# Patient Record
Sex: MALE | Race: WHITE
[De-identification: names, ages, dates, MRNs, and addresses within clinical notes are randomized per-mention and may not be internally consistent; named-entity substitution may affect disease eponyms.]

---

## 2018-05-01 ENCOUNTER — HOSPITAL ENCOUNTER (EMERGENCY)
Dept: HOSPITAL 57 - BURERS | Age: 78
Discharge: HOME | End: 2018-05-01
Payer: MEDICARE

## 2018-05-01 DIAGNOSIS — E03.9: ICD-10-CM

## 2018-05-01 DIAGNOSIS — Z79.84: ICD-10-CM
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
## 2018-05-01 DIAGNOSIS — Z87.891: ICD-10-CM

## 2018-05-01 DIAGNOSIS — Z79.899: ICD-10-CM

## 2018-05-01 DIAGNOSIS — S01.01XA: Primary | ICD-10-CM

## 2018-05-01 DIAGNOSIS — E11.9: ICD-10-CM
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
## 2018-05-01 DIAGNOSIS — F32.9: ICD-10-CM

## 2018-05-01 DIAGNOSIS — S01.81XA: ICD-10-CM

## 2018-05-01 LAB
ALBUMIN SERPL BCG-MCNC: 3.7 G/DL (ref 3.4–4.8)
ALP SERPL-CCNC: 94 U/L (ref 40–150)
ALT SERPL W P-5'-P-CCNC: 23 U/L (ref 8–55)
ANION GAP SERPL CALC-SCNC: 16 MMOL/L (ref 10–20)
AST SERPL-CCNC: 36 U/L (ref 5–34)
BASOPHILS # BLD AUTO: 0 THOU/UL (ref 0–0.2)
BASOPHILS NFR BLD AUTO: 1.2 % (ref 0–1)
BILIRUB SERPL-MCNC: 0.5 MG/DL (ref 0.2–1.2)
BUN SERPL-MCNC: 13 MG/DL (ref 8.4–25.7)
CALCIUM SERPL-MCNC: 9.3 MG/DL (ref 7.8–10.44)
CHLORIDE SERPL-SCNC: 106 MMOL/L (ref 98–107)
CK MB SERPL-MCNC: 6.9 NG/ML (ref 0–6.6)
CO2 SERPL-SCNC: 23 MMOL/L (ref 23–31)
CREAT CL PREDICTED SERPL C-G-VRATE: 0 ML/MIN (ref 70–130)
EOSINOPHIL # BLD AUTO: 0.1 THOU/UL (ref 0–0.7)
EOSINOPHIL NFR BLD AUTO: 1.9 % (ref 0–10)
GLOBULIN SER CALC-MCNC: 3 G/DL (ref 2.4–3.5)
GLUCOSE SERPL-MCNC: 262 MG/DL (ref 83–110)
HGB BLD-MCNC: 12.1 G/DL (ref 14–18)
LYMPHOCYTES # BLD AUTO: 0.9 THOU/UL (ref 1.2–3.4)
LYMPHOCYTES NFR BLD AUTO: 24.9 % (ref 21–51)
MANUAL DIFF??: NO
MCH RBC QN AUTO: 30.8 PG (ref 27–31)
MCV RBC AUTO: 91.2 FL (ref 80–94)
MDIFF COMPLETE?: YES
MONOCYTES # BLD AUTO: 0.2 THOU/UL (ref 0.11–0.59)
MONOCYTES NFR BLD AUTO: 5.6 % (ref 0–10)
NEUTROPHILS # BLD AUTO: 2.5 THOU/UL (ref 1.4–6.5)
NEUTROPHILS NFR BLD AUTO: 66.4 % (ref 42–75)
PLATELET # BLD AUTO: 78 THOU/UL (ref 130–400)
POTASSIUM SERPL-SCNC: 2.7 MMOL/L (ref 3.5–5.1)
RBC # BLD AUTO: 3.94 MILL/UL (ref 4.7–6.1)
SODIUM SERPL-SCNC: 142 MMOL/L (ref 136–145)
TROPONIN I SERPL DL<=0.01 NG/ML-MCNC: 0.02 NG/ML (ref ?–0.03)
WBC # BLD AUTO: 3.7 THOU/UL (ref 4.8–10.8)

## 2018-05-01 PROCEDURE — 74177 CT ABD & PELVIS W/CONTRAST: CPT

## 2018-05-01 PROCEDURE — 83605 ASSAY OF LACTIC ACID: CPT

## 2018-05-01 PROCEDURE — 82553 CREATINE MB FRACTION: CPT

## 2018-05-01 PROCEDURE — 12013 RPR F/E/E/N/L/M 2.6-5.0 CM: CPT

## 2018-05-01 PROCEDURE — 71045 X-RAY EXAM CHEST 1 VIEW: CPT

## 2018-05-01 PROCEDURE — 72125 CT NECK SPINE W/O DYE: CPT

## 2018-05-01 PROCEDURE — 80053 COMPREHEN METABOLIC PANEL: CPT

## 2018-05-01 PROCEDURE — 70450 CT HEAD/BRAIN W/O DYE: CPT

## 2018-05-01 PROCEDURE — A4216 STERILE WATER/SALINE, 10 ML: HCPCS

## 2018-05-01 PROCEDURE — 85025 COMPLETE CBC W/AUTO DIFF WBC: CPT

## 2018-05-01 PROCEDURE — 12002 RPR S/N/AX/GEN/TRNK2.6-7.5CM: CPT

## 2018-05-01 PROCEDURE — 84484 ASSAY OF TROPONIN QUANT: CPT

## 2018-05-01 PROCEDURE — 96365 THER/PROPH/DIAG IV INF INIT: CPT

## 2018-05-01 NOTE — CT
CT OF ABDOMEN AND PELVIS

CT OF LUMBAR SPINE

5/1/18

 

COMPARISON:  

None.

 

HISTORY: 

Fall, trauma, pain.

 

TECHNIQUE:  

Serial axial CT imaging obtained at 5 mm intervals from lung bases through pubic symphysis with intra
venous contrast. Coronal and sagittal imaging of the lumbar spine, abdomen and pelvis provided.

 

FINDINGS:  

The imaged lung bases appear unremarkable. No free intraperitoneal air or fluid. Numerous granulomata
 noted within the liver and spleen. Gallbladder, pancreas, adrenal glands, and kidneys unremarkable. 
Limited assessment of the bowel without contrast media demonstrates a distal colonic suture line. The
re is no evidence for bowel inflammatory change or obstruction. 

 

There is extensive atherosclerotic calcification of the abdominal aorta and its branches. No lymphade
nopathy is noted within the retroperitoneum, the pelvis, or the mesentery. 

 

Osseous structures demonstrate no widening of the sacroiliac joints or pubic symphysis. 

 

There is prominent degenerative change noted within the lumbar spine. From the T12-L1 level through t
he L5-S1 level, there is disc space narrowing, degenerative end plate change and vacuum disc formatio
n. There is no significant anterolisthesis or retrolisthesis. There is a disc osteophyte complex at L
2-3, L3-4, and L4-5 with associated central canal stenosis. There is multilevel lower lumbar spine fa
cet hypertrophy, most prominent on the right at the L5-S1 level with encroachment on the right neural
 foramen. There is no acute lumbar spine fracture.

 

IMPRESSION:  

Numerous incidental findings as described above. No acute traumatic abnormalities. 

 

POS: [facility]

## 2018-05-01 NOTE — CT
CT OF THE HEAD WITHOUT CONTRAST

5/1/18

 

COMPARISON:  

1/4/17

 

HISTORY: 

Tripped, fell, trauma, head injury, scalp laceration, fell down a hill. 

 

TECHNIQUE:  

Serial axial CT imaging at 5 mm intervals from vertex through skull base without contrast. 

 

FINDINGS:  

The imaged paranasal sinuses and mastoid air cells are well aerated. There is no displaced calvarial 
fracture. 

 

Cutaneous staples are seen within the right lateral scalp consistent with treated scalp laceration. T
here is no intracranial hemorrhage, midline shift, mass effect or ventricular enlargement. 

 

IMPRESSION:  

No intracranial hemorrhage or displaced calvarial fracture. 

 

POS: [facility]

## 2018-05-01 NOTE — RAD
PORTABLE UPRIGHT FRONTAL CHEST RADIOGRAPH

5/1/18

 

COMPARISON:  

None.

 

HISTORY: 

Tripped and fell, rolled down a hill, trauma, pain. 

 

FINDINGS:  

There is widening of the right AC and CC interspace with truncation of the distal clavicle. This is c
onsistent with an age indeterminate grade III AC joint injury on the right. No pneumothorax, pleural 
fluid, focal consolidation or alveolar edema. 

 

IMPRESSION:  

Age indeterminate, possibly remote, grade III right sided AC joint injury. Clinical correlation is es
sential. No focal consolidation or alveolar edema seen. 

 

POS: [facility]

## 2018-05-01 NOTE — CT
CERVICAL SPINE CT WITHOUT CONTRAST

5/1/18

 

COMPARISON:  

1/4/17

 

HISTORY: 

Fall, trauma, pain. 

 

TECHNIQUE:  

Serial axial CT imaging at 2.5 mm intervals from the skull base through the lung apices without contr
ast. Coronal and sagittal reformatted imaging obtained. 

 

FINDINGS:  

The imaged lung apices are unremarkable. 

 

The occipital condyles, the dense, the craniocervical junction, the atlantoaxial interspace, and the 
cervicothoracic junction demonstrate no acute findings. 

 

There is minimal anterolisthesis of C3 on C4 measuring in the 2-3 mm range, likely on the basis of fa
cet disease. No prevertebral soft tissue swelling. There is prominent degenerative change at the atla
nto-axial interspace. There is multilevel disc space narrowing and degenerative end plate change, mos
t prominent at C5-6. There is lucency at the C6-7 intervertebral disc space, stable when compared to 
prior imaging, which may be on the basis of prior surgery. Clinical correlation is required. 

 

No displaced fracture or evidence of dislocation is seen. 

 

IMPRESSION:  

Multilevel degenerative change within the cervical spine, stable. No acute fracture or dislocation no
dory. 

 

POS: [facility]

## 2019-01-29 ENCOUNTER — APPOINTMENT (OUTPATIENT)
Dept: FAMILY MEDICINE | Age: 79
End: 2019-01-29